# Patient Record
Sex: FEMALE | Race: BLACK OR AFRICAN AMERICAN | NOT HISPANIC OR LATINO | URBAN - METROPOLITAN AREA
[De-identification: names, ages, dates, MRNs, and addresses within clinical notes are randomized per-mention and may not be internally consistent; named-entity substitution may affect disease eponyms.]

---

## 2023-12-08 ENCOUNTER — OFFICE VISIT (OUTPATIENT)
Dept: URBAN - METROPOLITAN AREA CLINIC 40 | Facility: CLINIC | Age: 83
End: 2023-12-08
Payer: COMMERCIAL

## 2023-12-08 ENCOUNTER — LAB OUTSIDE AN ENCOUNTER (OUTPATIENT)
Dept: URBAN - METROPOLITAN AREA CLINIC 40 | Facility: CLINIC | Age: 83
End: 2023-12-08

## 2023-12-08 VITALS
BODY MASS INDEX: 30.09 KG/M2 | HEIGHT: 61 IN | WEIGHT: 159.4 LBS | DIASTOLIC BLOOD PRESSURE: 78 MMHG | HEART RATE: 82 BPM | TEMPERATURE: 97.7 F | SYSTOLIC BLOOD PRESSURE: 120 MMHG

## 2023-12-08 DIAGNOSIS — R11.2 NAUSEA AND VOMITING IN ADULT: ICD-10-CM

## 2023-12-08 DIAGNOSIS — K59.09 CHRONIC CONSTIPATION: ICD-10-CM

## 2023-12-08 DIAGNOSIS — K21.9 CHRONIC GERD: ICD-10-CM

## 2023-12-08 DIAGNOSIS — R19.01 RIGHT UPPER QUADRANT ABDOMINAL MASS: ICD-10-CM

## 2023-12-08 PROBLEM — 235595009: Status: ACTIVE | Noted: 2023-12-08

## 2023-12-08 PROCEDURE — 99203 OFFICE O/P NEW LOW 30 MIN: CPT | Performed by: NURSE PRACTITIONER

## 2023-12-08 RX ORDER — AMLODIPINE BESYLATE 10 MG/1
1 TABLET TABLET ORAL ONCE A DAY
Status: ACTIVE | COMMUNITY

## 2023-12-08 RX ORDER — CHOLECALCIFEROL (VITAMIN D3) 50 MCG
1 CAPSULE CAPSULE ORAL ONCE A DAY
Status: ACTIVE | COMMUNITY

## 2023-12-08 RX ORDER — ONDANSETRON 4 MG/1
1 TABLET ON THE TONGUE AND ALLOW TO DISSOLVE TABLET, ORALLY DISINTEGRATING ORAL ONCE A DAY
Status: ACTIVE | COMMUNITY

## 2023-12-08 RX ORDER — DOCUSATE SODIUM 100 MG/1
1 CAPSULE AS NEEDED CAPSULE ORAL ONCE A DAY
Status: ON HOLD | COMMUNITY

## 2023-12-08 RX ORDER — OMEPRAZOLE 20 MG/1
1 CAPSULE 30 MINUTES BEFORE MORNING MEAL CAPSULE, DELAYED RELEASE ORAL ONCE A DAY
Status: ACTIVE | COMMUNITY

## 2023-12-08 NOTE — HPI-TODAY'S VISIT:
83-year-old female patient with past medical history as listed below, new patient. Referral dated December 6, 2023 from [facility] primary care.  For evaluation of right upper quadrant mass, nausea vomiting x 2-week, decreased appetite, GERD.  Patient was seen in her home for follow-up regarding chronic conditions.  She had complaints of dealing with nausea often and decreased appetite for past week or 2.  She has been taking Pedialyte, prescribed omeprazole not alleviating symptoms of reflux and nausea.  Right upper quadrant mass, nontender on the provider's exam she was started on ondansetron and referral placed to GI for possible imaging and evaluation. Patient has never had colonoscopy per her report to provider.  Also was referred to cardiology for episodes of diaphoresis with bowel movement and vomiting and chest feeling different. Patient told provider that mass had been there for some time, patient with history of hysterectomy, cholecystectomy and tubal ligation, vitamin D, thyroid, CMP,CBC attached to referral, unremarkable.  --CT chest 2020 showed small hiatal hernia, spinal stenosis, nondisplaced compression fractures throughout the thoracic spine. --Patient had recent bone density exam March 2023.  -- The patient presents accompanied by family. She denies decreased appetite. She has c/o nausea, vomiting and constipation for 6 months. She said it is with any foods, comes and goes. She  eats early. A chicken wing might  be too spicy and make her nauseated or heartburn. Zofran didn't help, Pedialyte does. She gets sometimes constipated, No BM yesterday, usually has  2-3 a day, take MOM in pink bottle and it works well.  She has bottle of omeprazole and Zofran. Discussed to take omeprazole 30 mins before breakfast wich she is not doing. Denies NSAIDs. She sometime feels a sickness coming on her and it goes away. Sometimes burp and belch. Denies abdominal pain.

## 2023-12-22 ENCOUNTER — TELEPHONE ENCOUNTER (OUTPATIENT)
Dept: URBAN - METROPOLITAN AREA CLINIC 40 | Facility: CLINIC | Age: 83
End: 2023-12-22

## 2024-01-19 ENCOUNTER — DASHBOARD ENCOUNTERS (OUTPATIENT)
Age: 84
End: 2024-01-19

## 2024-01-19 ENCOUNTER — OFFICE VISIT (OUTPATIENT)
Dept: URBAN - METROPOLITAN AREA CLINIC 40 | Facility: CLINIC | Age: 84
End: 2024-01-19
Payer: COMMERCIAL

## 2024-01-19 VITALS
BODY MASS INDEX: 30.36 KG/M2 | TEMPERATURE: 97 F | WEIGHT: 160.8 LBS | SYSTOLIC BLOOD PRESSURE: 140 MMHG | HEART RATE: 83 BPM | HEIGHT: 61 IN | DIASTOLIC BLOOD PRESSURE: 88 MMHG

## 2024-01-19 DIAGNOSIS — K21.9 CHRONIC GERD: ICD-10-CM

## 2024-01-19 DIAGNOSIS — K57.90 DIVERTICULOSIS: ICD-10-CM

## 2024-01-19 DIAGNOSIS — K22.4 ESOPHAGEAL DYSMOTILITY: ICD-10-CM

## 2024-01-19 PROBLEM — 266434009: Status: ACTIVE | Noted: 2024-01-19

## 2024-01-19 PROBLEM — 397881000: Status: ACTIVE | Noted: 2024-01-19

## 2024-01-19 PROCEDURE — 99213 OFFICE O/P EST LOW 20 MIN: CPT | Performed by: NURSE PRACTITIONER

## 2024-01-19 RX ORDER — OMEPRAZOLE 20 MG/1
1 CAPSULE 30 MINUTES BEFORE MORNING MEAL CAPSULE, DELAYED RELEASE ORAL ONCE A DAY
Status: ACTIVE | COMMUNITY

## 2024-01-19 RX ORDER — ONDANSETRON 4 MG/1
1 TABLET ON THE TONGUE AND ALLOW TO DISSOLVE TABLET, ORALLY DISINTEGRATING ORAL ONCE A DAY
Status: ACTIVE | COMMUNITY

## 2024-01-19 RX ORDER — AMLODIPINE BESYLATE 10 MG/1
1 TABLET TABLET ORAL ONCE A DAY
Status: ACTIVE | COMMUNITY

## 2024-01-19 RX ORDER — CHOLECALCIFEROL (VITAMIN D3) 50 MCG
1 CAPSULE CAPSULE ORAL ONCE A DAY
Status: ACTIVE | COMMUNITY

## 2024-01-19 RX ORDER — DOCUSATE SODIUM 100 MG/1
1 CAPSULE AS NEEDED CAPSULE ORAL ONCE A DAY
Status: ON HOLD | COMMUNITY

## 2024-01-19 NOTE — HPI-TODAY'S VISIT:
83-year-old female patient with past medical history as listed below.  Referral dated December 6, 2023 from [facility] primary care. For evaluation of right upper quadrant mass, nausea vomiting x 2-week, decreased appetite, GERD. Patient was seen in her home for follow-up regarding chronic conditions. She had complaints of dealing with nausea often and decreased appetite for past week or 2. She has been taking Pedialyte, prescribed omeprazole not alleviating symptoms of reflux and nausea. Right upper quadrant mass, nontender on the provider's exam she was started on ondansetron and referral placed to GI for possible imaging and evaluation. Patient has never had colonoscopy per her report to provider. Also was referred to cardiology for episodes of diaphoresis with bowel movement and vomiting and chest feeling different. Patient told provider that mass had been there for some time, patient with history of hysterectomy, cholecystectomy and tubal ligation, vitamin D, thyroid, CMP,CBC attached to referral, unremarkable.  --CT chest 2020 showed small hiatal hernia, spinal stenosis, nondisplaced compression fractures throughout the thoracic spine.  --Patient had recent bone density exam March 2023.  -- Recall saw her 12/08/23 accompanied by family. She denies decreased appetite. She has c/o nausea, vomiting and constipation for 6 months. She said it is with any foods, comes and goes. She eats early. A chicken wing might be too spicy and make her nauseated or heartburn. Zofran didn't help, Pedialyte does. She gets sometimes constipated, No BM yesterday, usually has 2-3 a day, take MOM in pink bottle and it works well. She has bottle of omeprazole and Zofran. Discussed taking omeprazole 30 mins before breakfast which she is not doing. Denies NSAIDs. She sometime feels a sickness coming on her and it goes away. Sometimes burp and belch. Denies abdominal pain.   Advised may continue milk of magnesia and MiraLAX as needed, continue omeprazole daily before breakfast, GERD handout given ordered CT scan to evaluate mass and also ordered barium swallow to evaluate for reflux abnormality with swallowing.Notified patient   --December 22 notified no concerning findings on tests would discuss further at follow-up.   --Barium swallow 12/21/2023 small epiphrenic distal esophageal diverticulum,small amount of gastroesophageal reflux.  Mild esophageal dysmotility with diminished primary peristalsis and a few tertiary contractions.  Impression subtle mucosal irregularity in the mid esophagus which is nonspecific and can be seen in the setting of esophagitis.  Cannot exclude a subtle mucosal lesion.  Consider correlation with endoscopy as clinically indicated.  Mild esophageal dysmotility with diminished primary peristalsis and a few tertiary contractions.  Gastroesophageal reflux.  Small epiphrenic esophageal diverticulum.  --CT abdomen and pelvis December 20, 2023 no evidence of cirrhosis, evidence of prior granulomatous disease.  Normal pancreas, nonobstructing kidney stones, extensive diverticulosis.  Impression: nonobstructing calculi in the right kidney.  Status postcholecystectomy.  Colonic diverticulosis.  No evidence of inflammation in the region of the appendix.  Status post hysterectomy  -- The patient presents today accompanied by family. She is doing great, has no complaints. She recently seen PCP and had refills of omeprazole. She has been taking it in morning and is doing much better, no more symptoms. Discussed imaging findings. Advised to chew food well, not lay down after eating and to follow anti-reflux diet

## 2024-01-19 NOTE — EXAM-CQM EXCEPTIONS
CONSTITUTIONAL: well developed, well nourished , in no acute distress , ambulating without difficulty , normal         communication ability.  Elderly.        EYES: Conjuntivae and eyelids appear normal, Sclerae : White without injection.         HENT: Head, normocephalic, atraumatic, Face, Face within normal limits, Ears, External ears within normal limits.         CHEST: chest wall non-tender, breathing is unlabored without accessory muscle use, normal breath sounds.         CARDIOVASCULAR: no edema, no murmurs, regular rate and rhythm.         GASTROINTESTINAL: Abdomen , soft, nontender, nondistended , no guarding or rigidity , no masses palpable , normal bowel sounds , Liver and Spleen, no hepatosplenomegaly , liver nontender,  large scar traversing right abdomen from prior cholecystectomy, palpable mass at site, non tender.